# Patient Record
Sex: FEMALE | Race: BLACK OR AFRICAN AMERICAN | NOT HISPANIC OR LATINO | URBAN - METROPOLITAN AREA
[De-identification: names, ages, dates, MRNs, and addresses within clinical notes are randomized per-mention and may not be internally consistent; named-entity substitution may affect disease eponyms.]

---

## 2021-10-20 ENCOUNTER — OUTPATIENT (OUTPATIENT)
Dept: OUTPATIENT SERVICES | Facility: HOSPITAL | Age: 36
LOS: 1 days | End: 2021-10-20
Payer: COMMERCIAL

## 2021-10-20 VITALS
HEART RATE: 76 BPM | TEMPERATURE: 98 F | WEIGHT: 293 LBS | OXYGEN SATURATION: 100 % | RESPIRATION RATE: 16 BRPM | SYSTOLIC BLOOD PRESSURE: 134 MMHG | DIASTOLIC BLOOD PRESSURE: 90 MMHG

## 2021-10-20 DIAGNOSIS — Z01.818 ENCOUNTER FOR OTHER PREPROCEDURAL EXAMINATION: ICD-10-CM

## 2021-10-20 DIAGNOSIS — N93.8 OTHER SPECIFIED ABNORMAL UTERINE AND VAGINAL BLEEDING: ICD-10-CM

## 2021-10-20 DIAGNOSIS — Z98.890 OTHER SPECIFIED POSTPROCEDURAL STATES: Chronic | ICD-10-CM

## 2021-10-20 DIAGNOSIS — E28.2 POLYCYSTIC OVARIAN SYNDROME: ICD-10-CM

## 2021-10-20 LAB
ALBUMIN SERPL ELPH-MCNC: 2.8 G/DL — LOW (ref 3.3–5)
ALP SERPL-CCNC: 78 U/L — SIGNIFICANT CHANGE UP (ref 40–120)
ALT FLD-CCNC: 18 U/L — SIGNIFICANT CHANGE UP (ref 12–78)
ANION GAP SERPL CALC-SCNC: 6 MMOL/L — SIGNIFICANT CHANGE UP (ref 5–17)
AST SERPL-CCNC: 14 U/L — LOW (ref 15–37)
BILIRUB SERPL-MCNC: 0.3 MG/DL — SIGNIFICANT CHANGE UP (ref 0.2–1.2)
BLD GP AB SCN SERPL QL: SIGNIFICANT CHANGE UP
BUN SERPL-MCNC: 8 MG/DL — SIGNIFICANT CHANGE UP (ref 7–23)
CALCIUM SERPL-MCNC: 8.9 MG/DL — SIGNIFICANT CHANGE UP (ref 8.5–10.1)
CHLORIDE SERPL-SCNC: 111 MMOL/L — HIGH (ref 96–108)
CO2 SERPL-SCNC: 26 MMOL/L — SIGNIFICANT CHANGE UP (ref 22–31)
CREAT SERPL-MCNC: 0.8 MG/DL — SIGNIFICANT CHANGE UP (ref 0.5–1.3)
GLUCOSE SERPL-MCNC: 109 MG/DL — HIGH (ref 70–99)
HCG SERPL-ACNC: <1 MIU/ML — SIGNIFICANT CHANGE UP
HCT VFR BLD CALC: 29.8 % — LOW (ref 34.5–45)
HGB BLD-MCNC: 8.5 G/DL — LOW (ref 11.5–15.5)
MCHC RBC-ENTMCNC: 18.1 PG — LOW (ref 27–34)
MCHC RBC-ENTMCNC: 28.5 GM/DL — LOW (ref 32–36)
MCV RBC AUTO: 63.4 FL — LOW (ref 80–100)
NRBC # BLD: 0 /100 WBCS — SIGNIFICANT CHANGE UP (ref 0–0)
PLATELET # BLD AUTO: 341 K/UL — SIGNIFICANT CHANGE UP (ref 150–400)
POTASSIUM SERPL-MCNC: 3.2 MMOL/L — LOW (ref 3.5–5.3)
POTASSIUM SERPL-SCNC: 3.2 MMOL/L — LOW (ref 3.5–5.3)
PROT SERPL-MCNC: 7.7 G/DL — SIGNIFICANT CHANGE UP (ref 6–8.3)
RBC # BLD: 4.7 M/UL — SIGNIFICANT CHANGE UP (ref 3.8–5.2)
RBC # FLD: 28.5 % — HIGH (ref 10.3–14.5)
SODIUM SERPL-SCNC: 143 MMOL/L — SIGNIFICANT CHANGE UP (ref 135–145)
WBC # BLD: 7.31 K/UL — SIGNIFICANT CHANGE UP (ref 3.8–10.5)
WBC # FLD AUTO: 7.31 K/UL — SIGNIFICANT CHANGE UP (ref 3.8–10.5)

## 2021-10-20 PROCEDURE — 36415 COLL VENOUS BLD VENIPUNCTURE: CPT

## 2021-10-20 PROCEDURE — 85027 COMPLETE CBC AUTOMATED: CPT

## 2021-10-20 PROCEDURE — 86901 BLOOD TYPING SEROLOGIC RH(D): CPT

## 2021-10-20 PROCEDURE — 86900 BLOOD TYPING SEROLOGIC ABO: CPT

## 2021-10-20 PROCEDURE — 93005 ELECTROCARDIOGRAM TRACING: CPT

## 2021-10-20 PROCEDURE — 84702 CHORIONIC GONADOTROPIN TEST: CPT

## 2021-10-20 PROCEDURE — 80053 COMPREHEN METABOLIC PANEL: CPT

## 2021-10-20 PROCEDURE — G0463: CPT

## 2021-10-20 PROCEDURE — 93010 ELECTROCARDIOGRAM REPORT: CPT

## 2021-10-20 PROCEDURE — 86850 RBC ANTIBODY SCREEN: CPT

## 2021-10-20 RX ORDER — SPIRONOLACT/HYDROCHLOROTHIAZID 25 MG-25MG
0 TABLET ORAL
Qty: 0 | Refills: 0 | DISCHARGE

## 2021-10-20 RX ORDER — MEDROXYPROGESTERONE ACETATE 150 MG/ML
0 INJECTION, SUSPENSION, EXTENDED RELEASE INTRAMUSCULAR
Qty: 0 | Refills: 4 | DISCHARGE

## 2021-10-20 RX ORDER — LABETALOL HCL 100 MG
0 TABLET ORAL
Qty: 0 | Refills: 0 | DISCHARGE

## 2021-10-20 RX ORDER — AMLODIPINE BESYLATE 2.5 MG/1
0 TABLET ORAL
Qty: 0 | Refills: 0 | DISCHARGE

## 2021-10-20 NOTE — H&P PST ADULT - NSICDXPASTMEDICALHX_GEN_ALL_CORE_FT
PAST MEDICAL HISTORY:  Anemia     Hypertension     Morbid obesity BMI 49.9    PCOS (polycystic ovarian syndrome)      PAST MEDICAL HISTORY:  Anemia     Hypertension     Morbid obesity BMI 50    Other specified abnormal uterine and vaginal bleeding     PCOS (polycystic ovarian syndrome)

## 2021-10-20 NOTE — H&P PST ADULT - NSICDXFAMILYHX_GEN_ALL_CORE_FT
FAMILY HISTORY:  Father  Still living? Yes, Estimated age: Age Unknown  Family history of prostate cancer in father, Age at diagnosis: Age Unknown  FH: hypertension, Age at diagnosis: Age Unknown    Mother  Still living? Yes, Estimated age: Age Unknown  Family history of breast cancer in mother, Age at diagnosis: Age Unknown  FH: hypertension, Age at diagnosis: Age Unknown

## 2021-10-20 NOTE — H&P PST ADULT - NSANTHOSAYNRD_GEN_A_CORE
No. PRICILLA screening performed.  STOP BANG Legend: 0-2 = LOW Risk; 3-4 = INTERMEDIATE Risk; 5-8 = HIGH Risk

## 2021-10-20 NOTE — H&P PST ADULT - HISTORY OF PRESENT ILLNESS
36 y/o female with PMH of HTN, PCOS and anemia here for PST. Pt states she has been having vaginal bleeding since 7/22/21 and "bleeding finally stopped yesterday". Pt states her Hgb was 6.0 and received 2 units of pRBC on 10/5/21 as ordered by pt's hematologist. Pt received iron infusion weekly x 3 and will get a another infusion this week. Pt states she has had a long history of thickened endometrial lining and has had previous D&C x 4. Pt denies n/v/d and abdominal pain. Pt electing for suction dilation and curettage hysteroscopy, insertion mirena IUD on 11/1/2021.  36 y/o female with PMH of HTN, PCOS and anemia here for PST. Pt states she has been having vaginal bleeding since 7/22/21 and "bleeding finally stopped yesterday". Pt states her Hgb was 6.0 and received 2 units of pRBC on 10/5/21 as ordered by pt's hematologist. Pt received iron infusion weekly x 3 and will get a another infusion this week. Pt states she has had a long history of thickened endometrial lining and has had previous D&C x 3. Pt denies n/v/d and abdominal pain. Pt electing for suction dilation and curettage hysteroscopy, insertion mirena IUD on 11/1/2021.  34 y/o female with PMH of HTN, PCOS and anemia here for PST. Pt states she has been having vaginal bleeding since 7/22/21 and "bleeding finally stopped yesterday". Pt states her Hgb was 6.7 and received 2 units of pRBC on 10/5/21 as ordered by pt's hematologist. Pt received iron infusion weekly x 3 and will get a another infusion this week. Pt states she has had a long history of thickened endometrial lining and has had previous D&C x 3. Pt denies n/v/d and abdominal pain. Pt electing for suction dilation and curettage hysteroscopy, insertion mirena IUD on 11/1/2021.

## 2021-10-20 NOTE — H&P PST ADULT - ATTENDING COMMENTS
Patient presents for diagnostic hysteroscopy, D&C, and insertion of mirena IUD for aub unresponsive to medical mgt

## 2021-10-20 NOTE — H&P PST ADULT - GENERAL COMMENTS
Pt denies having traveled outside the country for the last 14 days. Pt denies having had the COVID19 infection and denies COVID19 positive contacts within the last 14 days. Pt received the 2nd dose of the Moderna COVID19 vaccine on 10/17/2021.

## 2021-10-20 NOTE — H&P PST ADULT - NSICDXPASTSURGICALHX_GEN_ALL_CORE_FT
PAST SURGICAL HISTORY:  H/O removal of cyst (Over right eye, Age 8)    S/P dilation and curettage (2012. 2013 & 2014)    S/P hernia repair (As a baby)     PAST SURGICAL HISTORY:  H/O removal of cyst (Over right eye, Age 8)    S/P dilation and curettage (2012, 2013 & 2014)    S/P hernia repair (As a baby)

## 2021-10-20 NOTE — H&P PST ADULT - PROBLEM SELECTOR PLAN 2
Medical clearance needed as per surgeon. CBC, Comprehensive panel, T&S, HCG and EKG ordered. Pre-op instructions and surgical scrubs given and pt verbalized understanding. Pt will make the appt for the COVID19 PCR testing at the [facility] testing site 3 days before surgery.

## 2021-10-20 NOTE — H&P PST ADULT - POSTERIOR CERVICAL L
FYI- This is a message which I sent to the patient via [employer]:  Your A1c is better than it had been, but still high at 8.0. This would indicate that your sugars have been averaging around 183 for the past 3 months.  I will forward this information to Dr. Wyatt, with whom you have an appointment on June 30th.  Paras Eric MD 
normal

## 2021-10-25 PROBLEM — D64.9 ANEMIA, UNSPECIFIED: Chronic | Status: ACTIVE | Noted: 2021-10-20

## 2021-10-25 PROBLEM — I10 ESSENTIAL (PRIMARY) HYPERTENSION: Chronic | Status: ACTIVE | Noted: 2021-10-20

## 2021-10-25 PROBLEM — N93.8 OTHER SPECIFIED ABNORMAL UTERINE AND VAGINAL BLEEDING: Chronic | Status: ACTIVE | Noted: 2021-10-20

## 2021-10-25 PROBLEM — E28.2 POLYCYSTIC OVARIAN SYNDROME: Chronic | Status: ACTIVE | Noted: 2021-10-20

## 2021-10-25 PROBLEM — E66.01 MORBID (SEVERE) OBESITY DUE TO EXCESS CALORIES: Chronic | Status: ACTIVE | Noted: 2021-10-20

## 2021-10-28 DIAGNOSIS — Z01.818 ENCOUNTER FOR OTHER PREPROCEDURAL EXAMINATION: ICD-10-CM

## 2021-10-28 PROBLEM — Z00.00 ENCOUNTER FOR PREVENTIVE HEALTH EXAMINATION: Status: ACTIVE | Noted: 2021-10-28

## 2021-10-29 ENCOUNTER — APPOINTMENT (OUTPATIENT)
Dept: DISASTER EMERGENCY | Facility: CLINIC | Age: 36
End: 2021-10-29

## 2021-10-29 RX ORDER — HYDROMORPHONE HYDROCHLORIDE 2 MG/ML
0.5 INJECTION INTRAMUSCULAR; INTRAVENOUS; SUBCUTANEOUS
Refills: 0 | Status: DISCONTINUED | OUTPATIENT
Start: 2021-11-01 | End: 2021-11-01

## 2021-10-29 RX ORDER — SODIUM CHLORIDE 9 MG/ML
1000 INJECTION, SOLUTION INTRAVENOUS
Refills: 0 | Status: DISCONTINUED | OUTPATIENT
Start: 2021-11-01 | End: 2021-11-01

## 2021-10-29 RX ORDER — OXYCODONE HYDROCHLORIDE 5 MG/1
5 TABLET ORAL ONCE
Refills: 0 | Status: DISCONTINUED | OUTPATIENT
Start: 2021-11-01 | End: 2021-11-01

## 2021-10-29 RX ORDER — ONDANSETRON 8 MG/1
4 TABLET, FILM COATED ORAL ONCE
Refills: 0 | Status: DISCONTINUED | OUTPATIENT
Start: 2021-11-01 | End: 2021-11-01

## 2021-10-29 NOTE — ASU PATIENT PROFILE, ADULT - NSICDXPASTMEDICALHX_GEN_ALL_CORE_FT
PAST MEDICAL HISTORY:  Anemia     Hypertension     Morbid obesity BMI 50    Other specified abnormal uterine and vaginal bleeding     PCOS (polycystic ovarian syndrome)

## 2021-10-29 NOTE — ASU PATIENT PROFILE, ADULT - NSICDXPASTSURGICALHX_GEN_ALL_CORE_FT
PAST SURGICAL HISTORY:  H/O removal of cyst (Over right eye, Age 8)    S/P dilation and curettage (2012, 2013 & 2014)    S/P hernia repair (As a baby)

## 2021-10-30 LAB — SARS-COV-2 N GENE NPH QL NAA+PROBE: NOT DETECTED

## 2021-10-31 ENCOUNTER — TRANSCRIPTION ENCOUNTER (OUTPATIENT)
Age: 36
End: 2021-10-31

## 2021-11-01 ENCOUNTER — TRANSCRIPTION ENCOUNTER (OUTPATIENT)
Age: 36
End: 2021-11-01

## 2021-11-01 ENCOUNTER — RESULT REVIEW (OUTPATIENT)
Age: 36
End: 2021-11-01

## 2021-11-01 ENCOUNTER — OUTPATIENT (OUTPATIENT)
Dept: OUTPATIENT SERVICES | Facility: HOSPITAL | Age: 36
LOS: 1 days | End: 2021-11-01
Payer: COMMERCIAL

## 2021-11-01 VITALS
OXYGEN SATURATION: 100 % | TEMPERATURE: 98 F | DIASTOLIC BLOOD PRESSURE: 83 MMHG | SYSTOLIC BLOOD PRESSURE: 144 MMHG | RESPIRATION RATE: 16 BRPM | HEART RATE: 73 BPM

## 2021-11-01 VITALS
SYSTOLIC BLOOD PRESSURE: 132 MMHG | DIASTOLIC BLOOD PRESSURE: 91 MMHG | TEMPERATURE: 98 F | OXYGEN SATURATION: 96 % | HEIGHT: 68 IN | WEIGHT: 293 LBS | HEART RATE: 84 BPM | RESPIRATION RATE: 18 BRPM

## 2021-11-01 DIAGNOSIS — Z98.890 OTHER SPECIFIED POSTPROCEDURAL STATES: Chronic | ICD-10-CM

## 2021-11-01 DIAGNOSIS — N93.8 OTHER SPECIFIED ABNORMAL UTERINE AND VAGINAL BLEEDING: ICD-10-CM

## 2021-11-01 DIAGNOSIS — E28.2 POLYCYSTIC OVARIAN SYNDROME: ICD-10-CM

## 2021-11-01 LAB
HCG UR QL: NEGATIVE — SIGNIFICANT CHANGE UP
POTASSIUM SERPL-MCNC: 5.1 MMOL/L — SIGNIFICANT CHANGE UP (ref 3.5–5.3)
POTASSIUM SERPL-SCNC: 5.1 MMOL/L — SIGNIFICANT CHANGE UP (ref 3.5–5.3)

## 2021-11-01 PROCEDURE — 88305 TISSUE EXAM BY PATHOLOGIST: CPT | Mod: 26

## 2021-11-01 PROCEDURE — 81025 URINE PREGNANCY TEST: CPT

## 2021-11-01 PROCEDURE — 58300 INSERT INTRAUTERINE DEVICE: CPT

## 2021-11-01 PROCEDURE — 84132 ASSAY OF SERUM POTASSIUM: CPT

## 2021-11-01 PROCEDURE — 36415 COLL VENOUS BLD VENIPUNCTURE: CPT

## 2021-11-01 PROCEDURE — 88305 TISSUE EXAM BY PATHOLOGIST: CPT

## 2021-11-01 PROCEDURE — 58558 HYSTEROSCOPY BIOPSY: CPT

## 2021-11-01 RX ORDER — MEDROXYPROGESTERONE ACETATE 150 MG/ML
0 INJECTION, SUSPENSION, EXTENDED RELEASE INTRAMUSCULAR
Qty: 0 | Refills: 4 | DISCHARGE

## 2021-11-01 RX ORDER — SODIUM CHLORIDE 9 MG/ML
1000 INJECTION, SOLUTION INTRAVENOUS
Refills: 0 | Status: DISCONTINUED | OUTPATIENT
Start: 2021-11-01 | End: 2021-11-01

## 2021-11-01 RX ORDER — ACETAMINOPHEN 500 MG
975 TABLET ORAL ONCE
Refills: 0 | Status: COMPLETED | OUTPATIENT
Start: 2021-11-01 | End: 2021-11-01

## 2021-11-01 RX ORDER — LABETALOL HCL 100 MG
0 TABLET ORAL
Qty: 0 | Refills: 0 | DISCHARGE

## 2021-11-01 RX ORDER — AMLODIPINE BESYLATE 2.5 MG/1
0 TABLET ORAL
Qty: 0 | Refills: 0 | DISCHARGE

## 2021-11-01 RX ORDER — SPIRONOLACT/HYDROCHLOROTHIAZID 25 MG-25MG
0 TABLET ORAL
Qty: 0 | Refills: 0 | DISCHARGE

## 2021-11-01 RX ADMIN — SODIUM CHLORIDE 75 MILLILITER(S): 9 INJECTION, SOLUTION INTRAVENOUS at 12:44

## 2021-11-01 RX ADMIN — Medication 975 MILLIGRAM(S): at 07:51

## 2021-11-01 RX ADMIN — Medication 975 MILLIGRAM(S): at 07:41

## 2021-11-01 RX ADMIN — SODIUM CHLORIDE 60 MILLILITER(S): 9 INJECTION, SOLUTION INTRAVENOUS at 07:40

## 2021-11-01 NOTE — ASU DISCHARGE PLAN (ADULT/PEDIATRIC) - CARE PROVIDER_API CALL
Soniya Valdovinos)  Obstetrics and Gynecology  [address] [place] [address]  Phone: [phone]  Fax: [phone]  Follow Up Time:

## 2021-11-01 NOTE — BRIEF OPERATIVE NOTE - NSICDXBRIEFPROCEDURE_GEN_ALL_CORE_FT
PROCEDURES:  Hysteroscopy with dilation of cervix and curettage procedure using suction 01-Nov-2021 11:21:43  Soniya Valdovinos  Hysteroscopy, with endometrial sampling and polypectomy 01-Nov-2021 11:22:00  Soniya Valdovinos

## 2022-06-17 NOTE — H&P PST ADULT - CONSTITUTIONAL DETAILS
Patient was recently seen on 6/15/22 and is requesting a prescription for iron. Ok to send?   well-groomed/no distress/obese

## 2022-08-02 NOTE — H&P PST ADULT - WEIGHT IN KG
RHEUMATOLOGY CONSULTATION NOTE    Patient ID: Olesya is a 26 year old female.  Referred By: Mellisa Lawler MD   Reason:    Chief Complaint   Patient presents with   • New Patient     Patient has been experiencing some inflammation in her under arm area,behind her eye socket. Patient also has headaches.       HPI    26 year old female     R axillary cyst  Orbital Pain  Headache  Ear Pain    Developed frontal and L temporal headaches in March that quickly increased in intensity to 10/10. +sensitivity to light and sound, with sound becoming worse. She gets nauseas as well. No notable auras. Daily headaches throughout the day. Not taking anything else save for CBD.   +dizzy. Started prednisone 60 mg 7/16/22.     R axillary lymph node - painful, swollen initially given antibiotics 7/19 decrease in size but remains swollen. No decrease in size with prednisone.     No rashes , oral or nasal ulcers, unexplained fevers, no inflammatory arthritis, pleurisy, effusions, blood clots, miscarriages, raynaud's.     Dry mouth - since May, everyday regardless of how much water she drinks. No difficulty swallowing food. No parotid gland swelling.     Dry eye - bilateral, intermittent. Does wear contacts.     R axillary lymph node swelling given prednisone.     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     Given high prednisone taper by neuro-ophthalmology.   They are leaving a biopsy as a last resort due to the difficult location of the tumor.     Family:  No AI disease    Social:  - No current smoking or drinking, social drinker/marijuana  - Works in consulting     Review of Systems   Constitutional: Negative for fever.   HENT: Negative for mouth sores.    Eyes: Negative for pain and redness.   Respiratory: Negative for shortness of breath.    Cardiovascular: Negative for chest pain.   Gastrointestinal: Negative for blood in stool.   Genitourinary: Negative for hematuria.   Musculoskeletal: Negative for  arthralgias and joint swelling.   Neurological: Negative for numbness.   Hematological: Negative for adenopathy.   Psychiatric/Behavioral: Negative for agitation.     #Dry Mouth  #L Orbital Tumor    #Concussions    Labs:   (+): ESR 54, WBC 13.4  (-): ADALI, IgG 4, ANCA, C3, C4, LAC    Imaging:     MRI Orbit  Enhancing mildly dilated soft tissue thickening along the medial left globe  and distal optic nerve, abutting extraocular muscles.  Most likely  differential etiologies include orbital pseudotumor or lymphoma.    MRI Brain:  Thick rim of abnormal intensely enhancing low T1/T2 signal intensity soft  tissue around the left globe including the attachment site of extraocular  muscles.  This mildly deforms the globes and causes mild proptosis.  This  tissue demonstrates restricted diffusion.  In a young woman, especially if  this is painful, orbital pseudotumor should be suspected.  Lymphoma is  considered less likely but cannot be excluded.  Granulomatous disease or  vasculitis considered less likely.  Enhancement noted in the anterior half intraorbital segment left optic  nerve sheath, also likely inflammation.    No past medical history on file.    No family history on file.  Social History     Tobacco Use   • Smoking status: Never Smoker   • Smokeless tobacco: Never Used   Substance Use Topics   • Alcohol use: Yes     Alcohol/week: 3.0 standard drinks     Types: 3 Glasses of wine per week   • Drug use: Not Currently     Current Outpatient Medications   Medication Sig Dispense Refill   • predniSONE (DELTASONE) 10 MG tablet      • norgestimate-ethinyl estradiol, triphasic, (ORTHO TRI-CYCLEN) 0.18/0.215/0.25 MG-35 MCG tablet Take 1 tablet by mouth daily.       No current facility-administered medications for this visit.     ALLERGIES:  Not on File        /70   Pulse (!) 57   Temp 98 °F (36.7 °C) (Temporal)   Ht 5' 4\" (1.626 m)   Wt 87.3 kg (192 lb 7.4 oz)   BMI 33.04 kg/m²   BSA 1.92 m²     Physical  Exam  Constitutional:       Appearance: Normal appearance. She is not ill-appearing.   HENT:      Head: Normocephalic and atraumatic.   Eyes:      General: No scleral icterus.     Conjunctiva/sclera: Conjunctivae normal.   Cardiovascular:      Rate and Rhythm: Normal rate and regular rhythm.   Pulmonary:      Effort: Pulmonary effort is normal. No respiratory distress.   Abdominal:      Palpations: Abdomen is soft.      Tenderness: There is no abdominal tenderness.   Lymphadenopathy:      Cervical: No cervical adenopathy.   Skin:     General: Skin is warm.      Findings: No rash.   Neurological:      General: No focal deficit present.      Mental Status: She is alert and oriented to person, place, and time.   Psychiatric:         Mood and Affect: Mood normal.           ASSESSMENT AND PLAN    Problem List Items Addressed This Visit    None     Visit Diagnoses     Dry mouth    -  Primary    Relevant Orders    SJOGREN'S SYNDROME    Chronic intractable headache, unspecified headache type        Relevant Orders    SERVICE TO NEUROLOGY           Return in about 3 months (around 11/2/2022).     Conner Doyle MD  [facility]    149.2